# Patient Record
Sex: FEMALE | Race: WHITE
[De-identification: names, ages, dates, MRNs, and addresses within clinical notes are randomized per-mention and may not be internally consistent; named-entity substitution may affect disease eponyms.]

---

## 2019-08-25 ENCOUNTER — HOSPITAL ENCOUNTER (INPATIENT)
Dept: HOSPITAL 35 - ER | Age: 84
LOS: 1 days | Discharge: NURSING, REHAB OR LONG-TERM CARE | DRG: 149 | End: 2019-08-26
Attending: INTERNAL MEDICINE | Admitting: INTERNAL MEDICINE
Payer: COMMERCIAL

## 2019-08-25 VITALS — SYSTOLIC BLOOD PRESSURE: 173 MMHG | DIASTOLIC BLOOD PRESSURE: 78 MMHG

## 2019-08-25 VITALS — WEIGHT: 124.3 LBS | HEIGHT: 65.98 IN | BODY MASS INDEX: 19.98 KG/M2

## 2019-08-25 VITALS — DIASTOLIC BLOOD PRESSURE: 77 MMHG | SYSTOLIC BLOOD PRESSURE: 146 MMHG

## 2019-08-25 VITALS — SYSTOLIC BLOOD PRESSURE: 174 MMHG | DIASTOLIC BLOOD PRESSURE: 79 MMHG

## 2019-08-25 VITALS — SYSTOLIC BLOOD PRESSURE: 136 MMHG | DIASTOLIC BLOOD PRESSURE: 82 MMHG

## 2019-08-25 DIAGNOSIS — I10: ICD-10-CM

## 2019-08-25 DIAGNOSIS — F32.9: ICD-10-CM

## 2019-08-25 DIAGNOSIS — E11.9: ICD-10-CM

## 2019-08-25 DIAGNOSIS — E78.5: ICD-10-CM

## 2019-08-25 DIAGNOSIS — F03.90: ICD-10-CM

## 2019-08-25 DIAGNOSIS — Z79.84: ICD-10-CM

## 2019-08-25 DIAGNOSIS — H81.10: Primary | ICD-10-CM

## 2019-08-25 DIAGNOSIS — F41.9: ICD-10-CM

## 2019-08-25 LAB
ALBUMIN SERPL-MCNC: 3.6 G/DL (ref 3.4–5)
ALT SERPL-CCNC: 20 U/L (ref 30–65)
ANION GAP SERPL CALC-SCNC: 12 MMOL/L (ref 7–16)
AST SERPL-CCNC: 24 U/L (ref 15–37)
BASOPHILS NFR BLD AUTO: 0.7 % (ref 0–2)
BILIRUB SERPL-MCNC: 0.6 MG/DL
BILIRUB UR-MCNC: NEGATIVE MG/DL
BUN SERPL-MCNC: 10 MG/DL (ref 7–18)
CALCIUM SERPL-MCNC: 9.3 MG/DL (ref 8.5–10.1)
CHLORIDE SERPL-SCNC: 100 MMOL/L (ref 98–107)
CO2 SERPL-SCNC: 23 MMOL/L (ref 21–32)
COLOR UR: YELLOW
CREAT SERPL-MCNC: 0.7 MG/DL (ref 0.6–1)
EOSINOPHIL NFR BLD: 0.3 % (ref 0–3)
ERYTHROCYTE [DISTWIDTH] IN BLOOD BY AUTOMATED COUNT: 13.8 % (ref 10.5–14.5)
GLUCOSE SERPL-MCNC: 161 MG/DL (ref 74–106)
GRANULOCYTES NFR BLD MANUAL: 66.1 % (ref 36–66)
HCT VFR BLD CALC: 41.3 % (ref 37–47)
HGB BLD-MCNC: 14.2 GM/DL (ref 12–15)
KETONES UR STRIP-MCNC: (no result) MG/DL
LIPASE: 140 U/L (ref 73–393)
LYMPHOCYTES NFR BLD AUTO: 28.3 % (ref 24–44)
MCH RBC QN AUTO: 30.6 PG (ref 26–34)
MCHC RBC AUTO-ENTMCNC: 34.5 G/DL (ref 28–37)
MCV RBC: 88.6 FL (ref 80–100)
MONOCYTES NFR BLD: 4.6 % (ref 1–8)
NEUTROPHILS # BLD: 6.3 THOU/UL (ref 1.4–8.2)
PLATELET # BLD: 237 THOU/UL (ref 150–400)
POTASSIUM SERPL-SCNC: 3.5 MMOL/L (ref 3.5–5.1)
PROT SERPL-MCNC: 8 G/DL (ref 6.4–8.2)
RBC # BLD AUTO: 4.66 MIL/UL (ref 4.2–5)
RBC # UR STRIP: (no result) /UL
RBC #/AREA URNS HPF: (no result) /HPF (ref 0–2)
SODIUM SERPL-SCNC: 135 MMOL/L (ref 136–145)
SP GR UR STRIP: 1.01 (ref 1–1.03)
SQUAMOUS: (no result) /LPF (ref 0–3)
SSA (PROTEIN CONFIRMATORY): (no result) MG/DL
URINE CLARITY: CLEAR
URINE GLUCOSE-RANDOM*: NEGATIVE
URINE LEUKOCYTES-REFLEX: NEGATIVE
URINE NITRITE-REFLEX: NEGATIVE
URINE WBC-REFLEX: (no result) /HPF (ref 0–5)
UROBILINOGEN UR STRIP-ACNC: 0.2 E.U./DL (ref 0.2–1)
WBC # BLD AUTO: 9.5 THOU/UL (ref 4–11)

## 2019-08-25 PROCEDURE — 10183: CPT

## 2019-08-25 NOTE — NUR
PT ARRIVED ON THE UNIT @2000 FROM ER A&OX3 WITH HX OF DEMENTIA. SON DAVID(DPOA)
HELPED WITH ANSWERING QUESTIONS AND ADMISSION. ZOFRAN GIVEN IN THE ER DUE TO
N/V. DENIES PAIN. UP WITH ASSISTX1 TO THE BATHROOM. SLIGHT DIZZINESS NOTED.
ADM DOCUMENTED, FALL PREC IN PLACE AND ROOM NEAR TO THE NURSE STATION. WILL
CONTINUE TO MONITOR TILL EOS.

## 2019-08-25 NOTE — H
[facility]
[address]
[place]   [address]                     HISTORY AND PHYSICAL          
_______________________________________________________________________________
 
Name:       MICHEAL SAUNDERS             Room #:         429-P       ADM IN  
M.R.#:      [id number]                       Account #:      [id number]  
Admission:  08/25/19    Attend Phys:    Kimberley Rodriguez MD     
Discharge:              Date of Birth:  [birth year]  
                                                          Report #: [id number]
                                                                    [id number]   
_______________________________________________________________________________
THIS REPORT FOR:   //name//                          
 
CC: Kimberley Rodriguez
 
HISTORY OF PRESENT ILLNESS:  The patient is an 84-year-old female who was
brought to the Emergency Room with severe vertigo where she was feeling very
dizzy and unsteady.  The patient was having some extensive nausea and vomiting. 
For this reason, she was given some meclizine and eventually if she was given
benzodiazepine without improvement of her symptoms and eventually she was
admitted to the hospital overnight.  When I came to see her today, the patient's
symptoms have cleared up completely.  The patient was able to move her head
without any problem and she was able to move in the room in the presence of
myself, the nurse and her son without any problem.  The patient is not able to
remember what happened to her yesterday because of her underlying dementia.
 
PAST MEDICAL HISTORY:  Significant for dementia, depression, anxiety and type 2
diabetes mellitus.  The patient does not use any walker or cane in the skilled
nursing facility.  The patient has a history of hyperlipidemia in addition to
hypertension.
 
MEDICATIONS:  The patient's medications include Aricept 5 mg daily, citalopram
10 mg daily, metformin 500 mg b.i.d., acetaminophen 650 mg every 8 hours,
Imodium 2 mg to use as directed, milk of magnesia 15 mL t.i.d. p.r.n., bisacodyl
5 mg daily and memantine 10 mg daily.
 
ALLERGIES:  No known drug allergies.
 
SOCIAL HISTORY:  The patient is living in a skilled memory care assisted living.
 No recent history of smoking, alcohol use or drug use.
 
REVIEW OF SYSTEMS:  Negative besides what was mentioned above.
 
PHYSICAL EXAMINATION:
VITAL SIGNS:  On arrival to the hospital, the patient's temperature was 36.7,
pulse 53, respirations 18, blood pressure 191/87 and last blood pressure is
173/78.
HEAD AND NECK:  Unremarkable.   Neck was supple.
LUNGS:  Clear to auscultation.
CARDIAC:  S1, S2.
ABDOMEN:  Benign.  Bowel sounds were positive.
EXTREMITIES:  Without any edema.
NEUROLOGIC:  Cranial nerves were intact.  Power is 5/5 on the upper and lower
extremities.  The patient does not have any sensory deficit.  The patient was
able to move her head in all directions without any limitation.  The patient
does not have any nystagmus.  The patient was able to walk without any
assistance and without any signs of vertigo or any nausea or vomiting.
 
 
 
[place] [address]                     HISTORY AND PHYSICAL          
_______________________________________________________________________________
 
Name:       MICHEAL SAUNDERS             Room #:         429-P       ADM IN  
M.R.#:      [id number]                       Account #:      [id number]  
Admission:  08/25/19    Attend Phys:    Kimberley Rodriguez MD     
Discharge:              Date of Birth:  [birth year]  
                                                          Report #: [id number]
                                                                    [id number]   
_______________________________________________________________________________
 
ASSESSMENT AND PLAN:  Benign positional vertigo that has cleared up completely. 
I talked to the patient's son and to the nurse on the floor about the patient's
condition.  We are planning to have the patient eat her diet and ambulate as
much as possible.  I will ask for the patient to be discharged back to her
assisted living today because his symptoms have cleared up completely.  The
patient will be followed at the assisted living.
 
 
 
 
 
 
 
 
 
 
 
 
 
 
 
 
 
 
 
 
 
 
 
 
 
 
 
 
 
 
 
 
 
 
 
 
 
                         
   By:                               
                   
D: 08/26/19 0855                           _____________________________________
T: 08/26/19 0908                           Kimberley Rodriguez MD               /nt

## 2019-08-26 VITALS — SYSTOLIC BLOOD PRESSURE: 191 MMHG | DIASTOLIC BLOOD PRESSURE: 86 MMHG

## 2019-08-26 NOTE — NUR
PT ASSESSED AT START OF SHIFT. PT UP AND AROUND IN THE ROOM W/ STANDBY AND
DOING WELL. STATES NO MORE DIZZINESS. DR. ZEPEDA IN WHILE SON HERE AND
DISCUSSED PLAN OF CARE TO SEND BACK HOME TODAY AND HAVE HOME HEALTH F/U.
INSTRUCTED PT TO DRINK WATER DURING THE DAY.
PT DISCHARGED AT THIS TIME W/ SON TRANSPORTING.

## 2019-08-26 NOTE — EKG
[facility] [address]
[place]  [address]
Phone:  [phone]                    ELECTROCARDIOGRAM REPORT      
_______________________________________________________________________________
 
Name:       MICHEAL SAUNDERS             Room #:         429-P       ADM IN  
M.R.#:      [id number]     Account #:      [id number]  
Admission:  19    Attend Phys:    Kimberley Rodriguez MD     
Discharge:              Date of Birth:  [birth year]  
                                                          Report #: [id number]
   [id number]
_______________________________________________________________________________
THIS REPORT FOR:   //name//                          
 
                         [facility] ED
                                       
Test Date:    2019               Test Time:    18:40:39
Pat Name:     MICHEAL SAUNDERS        Department:   
Patient ID:   [id number]            Room:         429
Gender:       F                        Technician:   HOLDEN
:          1935               Requested By: Juanid Knight
Order Number: [id number] MD:   Leobardo Machado
                                 Measurements
Intervals                              Axis          
Rate:         56                       P:            42
MD:           223                      QRS:          -7
QRSD:         101                      T:            15
QT:           488                                    
QTc:          472                                    
                           Interpretive Statements
Sinus bradycardia
Prolonged MD interval
Poor R wave progression
No previous ECG available for comparison
 
Electronically Signed On 2019 7:57:29 CDT by Leobardo Machado
[email or web address]
 
 
 
 
 
 
 
 
 
 
 
 
 
 
 
 
 
 
  <ELECTRONICALLY SIGNED>
   By: Leobardo Machado MD, [facility]   
  19     0757
D: 19 1840                           _____________________________________
T: 19 1840                           Leobardo Machado MD, FACC     /EPI

## 2019-08-26 NOTE — NUR
INITIAL ASSESSMENT:
Pt evaluated for d/c planning needs.  Reviewed chart and spoke with nurse, pt
and pt's son.  Pt is a long term resident at [facility].
Pt was independent with ADL's and used no DME.  Pt has history of dementia.
Son said that he was trying to make arrangements with [facility] for outpatient
PT and OT.  Spoke with admissions coordinator at [facility] and faxed referral.
 She said she would contact K[facility] to continue services with
outpatient PT and OT at facility.  Pt to be d/c today.  Son to provide
transport.  Patient choice letter signed and placed on chart.  No other needs
identified.

## 2019-11-19 ENCOUNTER — HOSPITAL ENCOUNTER (OUTPATIENT)
Dept: HOSPITAL 35 - CAT | Age: 84
End: 2019-11-19
Attending: INTERNAL MEDICINE
Payer: COMMERCIAL

## 2019-11-19 DIAGNOSIS — R90.82: ICD-10-CM

## 2019-11-19 DIAGNOSIS — R41.82: Primary | ICD-10-CM

## 2019-11-19 LAB
ANION GAP SERPL CALC-SCNC: 11 MMOL/L (ref 7–16)
BASOPHILS NFR BLD AUTO: 0.8 % (ref 0–2)
BUN SERPL-MCNC: 15 MG/DL (ref 7–18)
CALCIUM SERPL-MCNC: 10.1 MG/DL (ref 8.5–10.1)
CHLORIDE SERPL-SCNC: 102 MMOL/L (ref 98–107)
CO2 SERPL-SCNC: 26 MMOL/L (ref 21–32)
CREAT SERPL-MCNC: 0.8 MG/DL (ref 0.6–1)
EOSINOPHIL NFR BLD: 1.7 % (ref 0–3)
ERYTHROCYTE [DISTWIDTH] IN BLOOD BY AUTOMATED COUNT: 13.4 % (ref 10.5–14.5)
GLUCOSE SERPL-MCNC: 127 MG/DL (ref 74–106)
GRANULOCYTES NFR BLD MANUAL: 50.3 % (ref 36–66)
HCT VFR BLD CALC: 40.8 % (ref 37–47)
HGB BLD-MCNC: 13.6 GM/DL (ref 12–15)
LYMPHOCYTES NFR BLD AUTO: 39.2 % (ref 24–44)
MCH RBC QN AUTO: 29.5 PG (ref 26–34)
MCHC RBC AUTO-ENTMCNC: 33.4 G/DL (ref 28–37)
MCV RBC: 88.3 FL (ref 80–100)
MONOCYTES NFR BLD: 8 % (ref 1–8)
NEUTROPHILS # BLD: 4.6 THOU/UL (ref 1.4–8.2)
PLATELET # BLD: 417 THOU/UL (ref 150–400)
POTASSIUM SERPL-SCNC: 4.2 MMOL/L (ref 3.5–5.1)
RBC # BLD AUTO: 4.62 MIL/UL (ref 4.2–5)
SODIUM SERPL-SCNC: 139 MMOL/L (ref 136–145)
WBC # BLD AUTO: 9.1 THOU/UL (ref 4–11)

## 2020-07-08 ENCOUNTER — HOSPITAL ENCOUNTER (INPATIENT)
Dept: HOSPITAL 35 - ER | Age: 85
LOS: 5 days | Discharge: NURSING, REHAB OR LONG-TERM CARE | DRG: 470 | End: 2020-07-13
Attending: INTERNAL MEDICINE | Admitting: INTERNAL MEDICINE
Payer: COMMERCIAL

## 2020-07-08 VITALS — DIASTOLIC BLOOD PRESSURE: 101 MMHG | SYSTOLIC BLOOD PRESSURE: 148 MMHG

## 2020-07-08 VITALS — BODY MASS INDEX: 22.73 KG/M2 | HEIGHT: 62.99 IN | WEIGHT: 128.3 LBS

## 2020-07-08 VITALS — DIASTOLIC BLOOD PRESSURE: 67 MMHG | SYSTOLIC BLOOD PRESSURE: 125 MMHG

## 2020-07-08 VITALS — SYSTOLIC BLOOD PRESSURE: 132 MMHG | DIASTOLIC BLOOD PRESSURE: 84 MMHG

## 2020-07-08 VITALS — SYSTOLIC BLOOD PRESSURE: 194 MMHG | DIASTOLIC BLOOD PRESSURE: 90 MMHG

## 2020-07-08 DIAGNOSIS — F02.80: ICD-10-CM

## 2020-07-08 DIAGNOSIS — E78.5: ICD-10-CM
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
## 2020-07-08 DIAGNOSIS — S72.011A: Primary | ICD-10-CM

## 2020-07-08 DIAGNOSIS — F41.9: ICD-10-CM

## 2020-07-08 DIAGNOSIS — G31.83: ICD-10-CM

## 2020-07-08 DIAGNOSIS — Z79.82: ICD-10-CM

## 2020-07-08 DIAGNOSIS — Z79.84: ICD-10-CM
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
## 2020-07-08 DIAGNOSIS — I10: ICD-10-CM

## 2020-07-08 DIAGNOSIS — N39.0: ICD-10-CM

## 2020-07-08 DIAGNOSIS — F32.9: ICD-10-CM

## 2020-07-08 DIAGNOSIS — Z20.828: ICD-10-CM
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
## 2020-07-08 DIAGNOSIS — Z79.899: ICD-10-CM

## 2020-07-08 DIAGNOSIS — E11.9: ICD-10-CM
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
## 2020-07-08 LAB
ANION GAP SERPL CALC-SCNC: 8 MMOL/L (ref 7–16)
BACTERIA-REFLEX: (no result) /HPF
BASOPHILS NFR BLD AUTO: 0.9 % (ref 0–2)
BILIRUB UR-MCNC: NEGATIVE MG/DL
BUN SERPL-MCNC: 10 MG/DL (ref 7–18)
CALCIUM SERPL-MCNC: 9.2 MG/DL (ref 8.5–10.1)
CHLORIDE SERPL-SCNC: 102 MMOL/L (ref 98–107)
CO2 SERPL-SCNC: 28 MMOL/L (ref 21–32)
COLOR UR: YELLOW
CREAT SERPL-MCNC: 0.9 MG/DL (ref 0.6–1)
EOSINOPHIL NFR BLD: 1.3 % (ref 0–3)
ERYTHROCYTE [DISTWIDTH] IN BLOOD BY AUTOMATED COUNT: 14.1 % (ref 10.5–14.5)
GLUCOSE SERPL-MCNC: 172 MG/DL (ref 74–106)
GRANULOCYTES NFR BLD MANUAL: 65.5 % (ref 36–66)
HCT VFR BLD CALC: 41.2 % (ref 37–47)
HGB BLD-MCNC: 13.9 GM/DL (ref 12–15)
INR PPP: 1
KETONES UR STRIP-MCNC: NEGATIVE MG/DL
LYMPHOCYTES NFR BLD AUTO: 25.8 % (ref 24–44)
MCH RBC QN AUTO: 30.6 PG (ref 26–34)
MCHC RBC AUTO-ENTMCNC: 33.8 G/DL (ref 28–37)
MCV RBC: 90.6 FL (ref 80–100)
MONOCYTES NFR BLD: 6.5 % (ref 1–8)
NEUTROPHILS # BLD: 6.2 THOU/UL (ref 1.4–8.2)
PLATELET # BLD: 275 THOU/UL (ref 150–400)
POTASSIUM SERPL-SCNC: 3.9 MMOL/L (ref 3.5–5.1)
PROTHROMBIN TIME: 10.6 SECONDS (ref 9.3–11.4)
RBC # BLD AUTO: 4.55 MIL/UL (ref 4.2–5)
RBC # UR STRIP: NEGATIVE /UL
SODIUM SERPL-SCNC: 138 MMOL/L (ref 136–145)
SP GR UR STRIP: 1.01 (ref 1–1.03)
SQUAMOUS: (no result) /LPF (ref 0–3)
URINE CLARITY: CLEAR
URINE GLUCOSE-RANDOM*: NEGATIVE
URINE LEUKOCYTES-REFLEX: (no result)
URINE NITRITE-REFLEX: NEGATIVE
URINE PROTEIN (DIPSTICK): NEGATIVE
URINE WBC-REFLEX: (no result) /HPF (ref 0–5)
UROBILINOGEN UR STRIP-ACNC: 0.2 E.U./DL (ref 0.2–1)
WBC # BLD AUTO: 9.5 THOU/UL (ref 4–11)

## 2020-07-08 PROCEDURE — 50414: CPT

## 2020-07-08 PROCEDURE — 51225: CPT

## 2020-07-08 PROCEDURE — 10102: CPT

## 2020-07-08 PROCEDURE — 56530: CPT

## 2020-07-08 PROCEDURE — 62900: CPT

## 2020-07-08 PROCEDURE — 70005: CPT

## 2020-07-08 PROCEDURE — 51057: CPT

## 2020-07-08 PROCEDURE — 50445: CPT

## 2020-07-08 PROCEDURE — 56525: CPT

## 2020-07-08 PROCEDURE — 56521: CPT

## 2020-07-08 PROCEDURE — 62110: CPT

## 2020-07-08 PROCEDURE — 50010 RENAL EXPLORATION: CPT

## 2020-07-08 PROCEDURE — 51130: CPT

## 2020-07-08 PROCEDURE — 53369: CPT

## 2020-07-08 PROCEDURE — 10100: CPT

## 2020-07-08 PROCEDURE — 50101: CPT

## 2020-07-08 PROCEDURE — 57103: CPT

## 2020-07-08 PROCEDURE — 53000 INCISION OF URETHRA: CPT

## 2020-07-08 PROCEDURE — 51412: CPT

## 2020-07-08 PROCEDURE — 50382 CHANGE URETER STENT PERCUT: CPT

## 2020-07-08 NOTE — EKG
[facility]
[place]   [address]                     ELECTROCARDIOGRAM REPORT      
_______________________________________________________________________________
 
Name:       MICHEAL SAUNDERS             Room #:                     [facility]#:      [id number]                       Account #:      [id number]  
Admission:  20    Attend Phys:                          
Discharge:              Date of Birth:  [birth year]  
                                                          Report #: [id number]
                                                                    [id number]
_______________________________________________________________________________
THIS REPORT FOR:  
 
cc:  Kimberley Rodriguez MD, Ammar MD Couchonnal, Luis F. MD                                            ~
THIS REPORT FOR:   //name//                          
 
                         [facility] ED
                                       
Test Date:    2020               Test Time:    12:58:41
Pat Name:     MICHEAL SAUNDERS        Department:   
Patient ID:   [id number]            Room:          
Gender:       F                        Technician:   
:          1935               Requested By: Alexandra Callahan
Order Number: [id number] MD:   Laci Jackson
                                 Measurements
Intervals                              Axis          
Rate:         72                       P:            45
NH:           194                      QRS:          -37
QRSD:         110                      T:            11
QT:           420                                    
QTc:          460                                    
                           Interpretive Statements
Sinus rhythm
Left ventricular hypertrophy
Baseline wander in lead(s) V5
Compared to ECG 2019 18:40:39
Electronically Signed On 2020 14:32:04 CDT by Laci Jackson
[email or web address]
 
 
 
 
 
 
 
 
 
 
 
 
 
 
 
  <ELECTRONICALLY SIGNED>
   By: Laci Jackson MD        
  20     1432
D: 20 1258                           _____________________________________
T: 20 1258                           Laci Jackson MD          /[facility] [place] [facility]

## 2020-07-08 NOTE — EMS
[facility]
[address] [place]   [address]                     EMS Patient Care Report       
_______________________________________________________________________________
 
Name:       MICHEAL SAUNDERS             Room #:         436-P       ADM IN  
M.R.#:      [id number]                       Account #:      [id number]  
Admission:  20    Attend Phys:    Kimberley Rodriguez MD     
Discharge:              Date of Birth:  [birth year]  
                                                          Report #: [id number]
                                                                    [id number]
_______________________________________________________________________________
THIS REPORT FOR:   //name//                          
 
Report Transmitted: 2020 03:57
EMS Care Summary
[place], [place]
Incident [id number] @ 2020 11:04
 
Incident Location
[address]
 
Patient
MICHEAL SAUNDERS
Female, 85 Years
 1935
 
Patient Address
[address]
[place] [address]
 
Patient History
Dementia,Diabetes,Hypertension (HTN),Hyperlipidemia,Depression,Anxiety,
 
Patient Allergies
No known allergies,
 
Patient Medications
Metformin, Loperamide, Zofran, Aspirin, Ativan, Seroquel, Tylenol,
 
Chief Complaint
PAIN IN RIGHT SHOULDER AND RIGHT LEG
 
Disposition
Transported No Lights/[place]
 
Dispatch Reason
Falls
 
Transported To
[facility]
 
Narrative
SCENE; ON ARRIVAL PT FOUND LYING UPRIGHT IN BED IN BEDROOM AT ADDRESS PROVIDED. 
PT IS AWAKE AND ALERT WITH A GCS OF 15. STAFF REPORTS PT SUSTAINED AN 
UNWITNESSED FALL, AND WAS FOUND BY FACILITY STAFF. PT REPORTS SHE LOST HER 
 
 
 
[facility]
[address] [place]   [address]                     EMS Patient Care Report       
_______________________________________________________________________________
 
Name:       MICHEAL SAUNDERS             Room #:         436-P       ADM IN  
..#:      [id number]                       Account #:      [id number]  
Admission:  20    Attend Phys:    Kimberley Rodriguez MD     
Discharge:              Date of Birth:  [birth year]  
                                                          Report #: [id number]
                                                                    [id number]
_______________________________________________________________________________
FOOTING, AND FELL FROM A STANDING POSITION ONTO THE CARPET. PT DENIES LOC. PT 
HAS NO OBVIOUS INJURIES, BUT C/O RIGHT ELBOW AND RIGHT LEG PAIN. PT HAS A HX OF 
DEMENTIA AND STAFF REPORTS PT IS TYPICALLY SOMEWHAT CONFUSED, DEPENDING ON THE 
DAY, BUT IS ALSO TYPICALLY FAIRLY STUBBORN. PT INITIALLY DOES NOT WISH TO GO 
WITH EMS, BUT AGREES TO GO WITH EMS FOR URTHER EVAL AND TREATMENT BY AN ER DR. 
PT ASSISTED ONTO EMS STRETCHER. 
 
AMBULANCE VITALS MONITORED THROUGHOUT TRANSPORT. NO CHANGES IN PT STATUS EN 
ROUTE. 
 
Initial Vitals
@11:29P: 79,R: 20,BP: 178/72,Pain: 2/10,GCS: 14,Revised Trauma: 12,
@11:14P: 80,R: 20,Pain: 2/10,GCS: 14,Revised Trauma: 12,
 
Assessments
@11:15MENTAL:No Abnormalities,SKIN:No Abnormalities,HEENT:Head/Face: No 
Abnormalities,Eyes: No Abnormalities,Neck/Airway: No Abnormalities,LUNG 
SOUNDS:General: No Abnormalities,Left Upper: No Abnormalities,Right Upper: No 
Abnormalities,Left Lower: No Abnormalities,Right Lower: No 
Abnormalities,ABDOMEN:General: No Abnormalities,Left Upper: No 
Abnormalities,Right Upper: No Abnormalities,Left Lower: No Abnormalities,Right 
Lower: No Abnormalities,PELVIS//GI:No Abnormalities,EXTREMITIES:Right Arm: 
Other,Right Leg: Other,Left Arm: No Abnormalities,Left Leg: No 
Abnormalities,PULSE:NEURO:No Abnormalities,@11:26MENTAL:No 
Abnormalities,SKIN:No Abnormalities,HEENT:Head/Face: No Abnormalities,Eyes: No 
Abnormalities,Neck/Airway: No Abnormalities,LUNG SOUNDS:General: No 
Abnormalities,Left Upper: No Abnormalities,Right Upper: No Abnormalities,Left 
Lower: No Abnormalities,Right Lower: No Abnormalities,ABDOMEN:General: No 
Abnormalities,Left Upper: No Abnormalities,Right Upper: No Abnormalities,Left 
Lower: No Abnormalities,Right Lower: No Abnormalities,PELVIS//GI:No 
Abnormalities,EXTREMITIES:PULSE:NEURO:No Abnormalities, 
 
Impression
Extremity Pain
 
Procedures
@11:15ALS AssessmentResponse: UnchangedSucceeded@11:24StretcherResponse: 
Unchanged 
 
Timeline
11:02,Call Received
11:02,Dispatch Notified
11:04,Dispatched
11:04,En Route
11:12,On Scene
11:14,At Patient
 
 
 
[address]   [address]                     EMS Patient Care Report       
_______________________________________________________________________________
 
Name:       MICHEAL SAUNDERS             Room #:         [facility]-       ADM IN  
M.R.#:      [id number]                       Account #:      [id number]  
Admission:  20    Attend Phys:    Kimberley Rodriguez MD     
Discharge:              Date of Birth:  [birth year]  
                                                          Report #: [id number]
                                                                    [id number]
_______________________________________________________________________________
11:14,BP: 162/ M,PULSE: 80,RR: 20 R,SPO2:  Ox,ETCO2:  ,BG: ,PAIN: 2,GCS: 14,
11:15,ALS Assessment,Response: UnchangedSucceeded,
11:24,Stretcher,Response: Unchanged
11:29,BP: 178/72 M,PULSE: 79,RR: 20 R,SPO2:  Ox,ETCO2:  ,BG: ,PAIN: 2,GCS: 14,
11:29,Depart Scene
11:33,At Destination
11:53,Call Closed
 
Disclaimer
v1.1     Copyright 2020 [employer]
This EMS Care Summary contains data elements from the applicable legal record 
(which may be displayed differently). It is designed to provide pertinent 
information for the following purposes: continuity of care, clinical quality, 
and state data reporting. The complete legal record is available to ED staff 
and administrators of the receiving hospital in [employer]'s Patient Tracker. All data 
is provided "as is."

## 2020-07-09 VITALS — DIASTOLIC BLOOD PRESSURE: 76 MMHG | SYSTOLIC BLOOD PRESSURE: 138 MMHG

## 2020-07-09 VITALS — DIASTOLIC BLOOD PRESSURE: 84 MMHG | SYSTOLIC BLOOD PRESSURE: 175 MMHG

## 2020-07-09 VITALS — DIASTOLIC BLOOD PRESSURE: 81 MMHG | SYSTOLIC BLOOD PRESSURE: 152 MMHG

## 2020-07-09 VITALS — SYSTOLIC BLOOD PRESSURE: 155 MMHG | DIASTOLIC BLOOD PRESSURE: 65 MMHG

## 2020-07-09 VITALS — DIASTOLIC BLOOD PRESSURE: 65 MMHG | SYSTOLIC BLOOD PRESSURE: 141 MMHG

## 2020-07-09 VITALS — SYSTOLIC BLOOD PRESSURE: 149 MMHG | DIASTOLIC BLOOD PRESSURE: 70 MMHG

## 2020-07-09 VITALS — SYSTOLIC BLOOD PRESSURE: 154 MMHG | DIASTOLIC BLOOD PRESSURE: 74 MMHG

## 2020-07-09 LAB
ANION GAP SERPL CALC-SCNC: 9 MMOL/L (ref 7–16)
BUN SERPL-MCNC: 9 MG/DL (ref 7–18)
CALCIUM SERPL-MCNC: 8.5 MG/DL (ref 8.5–10.1)
CHLORIDE SERPL-SCNC: 104 MMOL/L (ref 98–107)
CO2 SERPL-SCNC: 24 MMOL/L (ref 21–32)
CREAT SERPL-MCNC: 0.7 MG/DL (ref 0.6–1)
ERYTHROCYTE [DISTWIDTH] IN BLOOD BY AUTOMATED COUNT: 14.5 % (ref 10.5–14.5)
GLUCOSE SERPL-MCNC: 192 MG/DL (ref 74–106)
HCT VFR BLD CALC: 39.9 % (ref 37–47)
HGB BLD-MCNC: 13 GM/DL (ref 12–15)
MCH RBC QN AUTO: 30.2 PG (ref 26–34)
MCHC RBC AUTO-ENTMCNC: 32.7 G/DL (ref 28–37)
MCV RBC: 92.5 FL (ref 80–100)
PLATELET # BLD: 242 THOU/UL (ref 150–400)
POTASSIUM SERPL-SCNC: 3.8 MMOL/L (ref 3.5–5.1)
RBC # BLD AUTO: 4.31 MIL/UL (ref 4.2–5)
SODIUM SERPL-SCNC: 137 MMOL/L (ref 136–145)
WBC # BLD AUTO: 8.5 THOU/UL (ref 4–11)

## 2020-07-09 PROCEDURE — 0SRR0J9 REPLACEMENT OF RIGHT HIP JOINT, FEMORAL SURFACE WITH SYNTHETIC SUBSTITUTE, CEMENTED, OPEN APPROACH: ICD-10-PCS | Performed by: ORTHOPAEDIC SURGERY

## 2020-07-09 NOTE — EKG
[facility]
[address]
[place]   [address]                     ELECTROCARDIOGRAM REPORT      
_______________________________________________________________________________
 
Name:       MICHEAL SAUNDERS             Room #:         436-P       ADM IN  
M.R.#:      [id number]                       Account #:      [id number]  
Admission:  20    Attend Phys:    Kimberley Rodriguez MD     
Discharge:              Date of Birth:  [birth year]  
                                                          Report #: [id number]
                                                                    [id number]
_______________________________________________________________________________
THIS REPORT FOR:  
 
cc:  Kimberley Rodriguez MD, Ammar MD Lundgren,Leobardo WU MD [facility]                                        ~
THIS REPORT FOR:   //name//                          
 
                         [facility] ED
                                       
Test Date:    2020               Test Time:    16:01:47
Pat Name:     MICHEAL SAUNDERS        Department:   
Patient ID:   [id number]            Room:         436 
Gender:       F                        Technician:   TASHA
:          1935               Requested By: Alexandra Callahan
Order Number: [id number] MD:   Leobardo Machado
                                 Measurements
Intervals                              Axis          
Rate:         115                      P:            -33
IN:           150                      QRS:          -47
QRSD:         91                       T:            28
QT:           322                                    
QTc:          446                                    
                           Interpretive Statements
Sinus tachycardia
Left anterior fascicular block
Poor R wave progression
Nonspecific ST segment abnormality
Compared to ECG 2020 12:58:41
ST segment abnormality is now present
Electronically Signed On 2020 7:42:55 CDT by Leobardo Machado
[email or web address]
 
 
 
 
 
 
 
 
 
 
 
 
 
  <ELECTRONICALLY SIGNED>
   By: Leobardo Machado MD, [facility]   
  20     0742
D: 20 1601                           _____________________________________
T: 20 1601                           Leobardo Machado MD, FAC     /EPI

## 2020-07-09 NOTE — HC
[facility]
[address]
[place]   [address]                     CONSULTATION                  
_______________________________________________________________________________
 
Name:       MICHEAL SAUNDERS             Room #:         [facility]-       ADM IN  
M.R.#:      [id number]                       Account #:      [id number]  
Admission:  07/08/20    Attend Phys:    Kimberley Rodriguez MD     
Discharge:              Date of Birth:  [birth year]  
                                                          Report #: [id number]
                                                                    [id number]   
_______________________________________________________________________________
THIS REPORT FOR:  
 
cc:  Kimberley Rodriguez MD,Onesimo العلي MD, MD                                           ~
CC: Kimberley Rodriguez
 
DATE OF SERVICE:  07/08/2020
 
 
CHIEF COMPLAINT:  Right proximal femoral fracture.
 
HISTORY OF PRESENT ILLNESS:  This 85-year-old female is demented and I believe
lives in an extended care facility.  She is confused and ambulates
independently, but has poor balance and I believe has had multiple falls.  Her
son states that he has tried to encourage therapy and use a walker, but she has
refused.  She fell again today injuring the right hip.  She was brought to [facility] Emergency Department where x-rays confirm an impacted, slightly angulated
fracture of the right femoral neck.  She has no other apparent injuries.
 
At the time of my evaluation, she is confused and therefore difficult to achieve
any sort of a history.  She seems to have no obvious complaints or injuries
involving the neck, back or upper extremities.  The right lower extremity does
not appear to be shortened or significantly rotated or deformed.  There is,
however, discomfort with any attempted movement about the right hip.  The right
knee, lower leg, foot, and ankle appear to be normal.  The left lower extremity
reveals satisfactory movement at the hip with minimal discomfort.  The left
knee, lower leg, foot, and ankle appear to be normal.
 
X-rays of the pelvis and right hip reveal an impacted femoral neck fracture,
which is displaced posteriorly and into valgus position.  While there is
satisfactory bony contact at this point, the fracture does appear to be
moderately unstable given the posterior rotational deformity.
 
IMPRESSION:  Right femoral neck fracture with moderate displacement.  I have
discussed this issue at some length with the patient's eldest son who is her
durable power of .  I have explained that we could manage this either
without surgery or with percutaneous screw fixation or with proximal femoral
hemiarthroplasty.  He states that she is still ambulating and is not very
cooperative.  Given this, I doubt that nonsurgical management or percutaneous
screw fixation would be successful.  Consequently, I believe a proximal femoral
hemiarthroplasty would be the best option to allow her to resume ambulation with
hopefully some assistance and some monitoring.  At the time of this dictation,
the son is in general agreement and would like to proceed with planning and
scheduling for surgery either tomorrow or the following day.  He notes he will
be talking with the rest of the family before offering his final permission. 
 
 
 
[place] [address]                     CONSULTATION                  
_______________________________________________________________________________
 
Name:       MICHEAL SAUNDERS             Room #:         436-P       [facility] [place]  
M.R.#:      [id number]                       Account #:      [id number]  
Admission:  07/08/20    Attend Phys:    Kimberley Rodriguez MD     
Discharge:              Date of Birth:  [birth year]  
                                                          Report #: [id number]
                                                                    [id number]   
_______________________________________________________________________________
Therefore, at this point, we will go ahead with medical clearance and keep her
n.p.o. after midnight.  If there is time in the operating room tomorrow, then I
will plan to proceed with a proximal femoral hemiarthroplasty.
 
 
 
 
 
 
 
 
 
 
 
 
 
 
 
 
 
 
 
 
 
 
 
 
 
 
 
 
 
 
 
 
 
 
 
 
 
 
 
 
 
  <ELECTRONICALLY SIGNED>
   By: Onesimo Rivera MD           
  07/09/20     1137
D: 07/08/20 1745                           _____________________________________
T: 07/08/20 1830                           Onesimo Rivera MD             /nt

## 2020-07-09 NOTE — NUR
PT ASSESSED AT START OF SHIFT. PT CONFUSED AND CRYING W/ PAIN THIS AFTERNOON.
MORPHINE GIVEN AND PT RELAXED BUT PULLED OUT HER DEGROOT W/ BALLOON INTACT.
INFORMED SURGERY RN TO ENABLE THEN TO REPLACE IT.
TALKED W/ SON AND HE HAD TALKED W/ DR. JACKSON RE SURGERY AND HE TALKED W/ HIS
MOTHER AT THE BEDSIDE.
PT TAKEN TO PREOP AT 1700.

## 2020-07-09 NOTE — NUR
Case opened to follow for dc planning. Writer visited with the pt anirudh at
bedside. She is oriented to person but not place or situation. She indicates
that her son Herber is her emergency contact. She was then taken to surgery for
hip fx. Chart reviewed and discussed with the care team. Writer spoke with her
son Herber who is her medical DPOA. A copy of this document, her living will and
outside the hospital dnr form are on the chart. The pt is a resident from
[facility]. She has been living at [facility] in their
debo since 2018 but moved to the memory care unit this past Nov due to
increased stm loss. She is ambulatory and fell at the facility. Her son
indicates that they would like her to return to SNF at [facility] if she is
able to participate with therapy. He is available via phone as needed. Message
left for [facility] admissions regarding snf referral as they will need to get
auth from Arianna Felder. Will await therapy evals and fax a clinical update
tomorrow. Poss dc 2-3 days pending her postop progress.

## 2020-07-09 NOTE — NUR
PT ARRIVED FROM THE ER VIA CART @1920. AOX2-3 FORGETFULL STILL ABLE TO ANSWER
QUESTIONS. ORIENTED TO THE ROOM. PT SPOKE TO SON OVER THE PHONE. CALLED DR ZEPEDA AND ORDERS RECEIVED FOR PAIN CONTROL AND FLUID INFUISION. MORPHINE
GIVEN FOR RT HIP PAIN OF 5/10. PT NPO FOR SX TOMORROW. FOLLEY CATH IN PLACE.
VSS. CALL LIGHT IN REACH, FALL PREC IN PLACE AND WILL CONT WITH POC TILL EOS.

## 2020-07-10 VITALS — DIASTOLIC BLOOD PRESSURE: 63 MMHG | SYSTOLIC BLOOD PRESSURE: 137 MMHG

## 2020-07-10 VITALS — SYSTOLIC BLOOD PRESSURE: 152 MMHG | DIASTOLIC BLOOD PRESSURE: 70 MMHG

## 2020-07-10 VITALS — SYSTOLIC BLOOD PRESSURE: 155 MMHG | DIASTOLIC BLOOD PRESSURE: 78 MMHG

## 2020-07-10 VITALS — DIASTOLIC BLOOD PRESSURE: 63 MMHG | SYSTOLIC BLOOD PRESSURE: 114 MMHG

## 2020-07-10 LAB
ANION GAP SERPL CALC-SCNC: 11 MMOL/L (ref 7–16)
BASOPHILS NFR BLD AUTO: 0.7 % (ref 0–2)
BUN SERPL-MCNC: 9 MG/DL (ref 7–18)
CALCIUM SERPL-MCNC: 8.2 MG/DL (ref 8.5–10.1)
CHLORIDE SERPL-SCNC: 105 MMOL/L (ref 98–107)
CO2 SERPL-SCNC: 23 MMOL/L (ref 21–32)
CREAT SERPL-MCNC: 0.8 MG/DL (ref 0.6–1)
EOSINOPHIL NFR BLD: 1.1 % (ref 0–3)
ERYTHROCYTE [DISTWIDTH] IN BLOOD BY AUTOMATED COUNT: 15 % (ref 10.5–14.5)
GLUCOSE SERPL-MCNC: 175 MG/DL (ref 74–106)
GRANULOCYTES NFR BLD MANUAL: 64.9 % (ref 36–66)
HCT VFR BLD CALC: 37.7 % (ref 37–47)
HGB BLD-MCNC: 12.9 GM/DL (ref 12–15)
LYMPHOCYTES NFR BLD AUTO: 22.7 % (ref 24–44)
MCH RBC QN AUTO: 31.9 PG (ref 26–34)
MCHC RBC AUTO-ENTMCNC: 34.2 G/DL (ref 28–37)
MCV RBC: 93.4 FL (ref 80–100)
MONOCYTES NFR BLD: 10.6 % (ref 1–8)
NEUTROPHILS # BLD: 7.1 THOU/UL (ref 1.4–8.2)
PLATELET # BLD: 222 THOU/UL (ref 150–400)
POTASSIUM SERPL-SCNC: 4.2 MMOL/L (ref 3.5–5.1)
RBC # BLD AUTO: 4.03 MIL/UL (ref 4.2–5)
SODIUM SERPL-SCNC: 139 MMOL/L (ref 136–145)
WBC # BLD AUTO: 10.9 THOU/UL (ref 4–11)

## 2020-07-10 NOTE — NUR
PT ARRIVED FROM SX @1930 RESTING IN BED. QUOC DRESSING, HEMOVAC INTACT.
TEDHOSE ON BLE. IV INTACT AND FLUIDS STARTED. BLOODSUGAR CHECKED AND INSULIN
ADMINISTERED. SON CALLED THE UNIT AND THIS NURSE UPDATED HIM ON PROGRESS.
MORPHINE GIVEN TO PT FOR PAIN MANAGEMENT AND ICE PACK PLACED FOR COMFORT.
FOLLEY CATH INTACT AND DRAINING. SCD'S IN PLACE. VITAL SIGNS CHECKED. PT ON
CLEAR LIQUID. FALL PREC IN PLACE AND CALL LIGHT IN REACH WILL CONT WITH POC
TILL EOS.

## 2020-07-10 NOTE — NUR
Another message was left for admissions at [facility]. Clinical
updated faxed and with request for snf auth to be submitted for admission over
the weekend if medically stable. Facility number is [id number] and
admissions cell is [phone] and fax number [phone].

## 2020-07-11 VITALS — SYSTOLIC BLOOD PRESSURE: 178 MMHG | DIASTOLIC BLOOD PRESSURE: 86 MMHG

## 2020-07-11 VITALS — DIASTOLIC BLOOD PRESSURE: 64 MMHG | SYSTOLIC BLOOD PRESSURE: 139 MMHG

## 2020-07-11 VITALS — DIASTOLIC BLOOD PRESSURE: 78 MMHG | SYSTOLIC BLOOD PRESSURE: 167 MMHG

## 2020-07-11 LAB
ANION GAP SERPL CALC-SCNC: 9 MMOL/L (ref 7–16)
BUN SERPL-MCNC: 8 MG/DL (ref 7–18)
CALCIUM SERPL-MCNC: 8 MG/DL (ref 8.5–10.1)
CHLORIDE SERPL-SCNC: 103 MMOL/L (ref 98–107)
CO2 SERPL-SCNC: 23 MMOL/L (ref 21–32)
CREAT SERPL-MCNC: 0.6 MG/DL (ref 0.6–1)
ERYTHROCYTE [DISTWIDTH] IN BLOOD BY AUTOMATED COUNT: 14.2 % (ref 10.5–14.5)
GLUCOSE SERPL-MCNC: 158 MG/DL (ref 74–106)
HCT VFR BLD CALC: 35.7 % (ref 37–47)
HGB BLD-MCNC: 11.8 GM/DL (ref 12–15)
MCH RBC QN AUTO: 30.8 PG (ref 26–34)
MCHC RBC AUTO-ENTMCNC: 32.9 G/DL (ref 28–37)
MCV RBC: 93.5 FL (ref 80–100)
PLATELET # BLD: 174 THOU/UL (ref 150–400)
POTASSIUM SERPL-SCNC: 3.6 MMOL/L (ref 3.5–5.1)
RBC # BLD AUTO: 3.82 MIL/UL (ref 4.2–5)
SODIUM SERPL-SCNC: 135 MMOL/L (ref 136–145)
WBC # BLD AUTO: 10.3 THOU/UL (ref 4–11)

## 2020-07-11 NOTE — O
[facility]
[address]
[place]   [address]                     OPERATIVE REPORT              
_______________________________________________________________________________
 
Name:       MICHEAL SAUNDERS             Room #:         436-       ADM IN  
M.R.#:      [id number]                       Account #:      [id number]  
Admission:  07/08/20    Attend Phys:    Kimberley Rodriguez MD     
Discharge:              Date of Birth:  [birth year]  
                                                          Report #: [id number]
                                                                    [id number]   
_______________________________________________________________________________
THIS REPORT FOR:  
 
cc:  Kimberley Rodriguez MD,Onesimo العلي MD, MD                                           ~
CC: Kimberley Rodriguez
 
PREOPERATIVE DIAGNOSIS:  Right femoral neck fracture with displacement.
 
POSTOPERATIVE DIAGNOSIS:  Right femoral neck fracture with displacement.
 
PROCEDURE:  Right proximal femoral hemiarthroplasty.
 
SURGEON:  Onesimo Rivera MD
 
INDICATIONS:  This 85-year-old female is still ambulatory, but has poor balance
and seems to fall frequently.  She also is confused and demented, making her
care difficult.  She fell again injuring the right hip.  X-rays revealed an
unstable femoral neck fracture.  I have discussed with her family the treatment
options and they prefer to go ahead with surgical hemiarthroplasty hoping that
she can resume ambulation despite her dementia and her balance issues.
 
DESCRIPTION OF PROCEDURE:  The patient was taken to the operating room where she
was placed under general anesthesia.  Prophylactic intravenous antibiotics were
administered.  She was turned to the left lateral decubitus position.  The right
hip, thigh and leg were meticulously prepped and draped.  A slightly curving
skin incision was made over the greater trochanter extending through fascia and
exposing the posterior aspect of the hip joint.  The short external rotators and
capsule were taken down and preserved and tagged with several #1 Tevdek sutures.
 The femoral neck was found to be fractured and unstable.  A femoral neck
osteotomy was performed and the head and neck were removed.  The head was
measured at 43 mm in diameter.  The canal was prepared using reamers and hand
broaches.  The Smith and Nephew hip system was utilized.  A size 12 cement stem
seemed to fit most appropriately.  A trial reduction was performed and the hip
was nicely reduced and stable when using a +0 neck length and a 43 mm unipolar
head.
 
These trial components were removed.  The canal was thoroughly irrigated and
dried.  A cement restrictor was placed.  Methyl methacrylate cement was mixed
and injected into the canal.  The Smith and Nephew size 12 Synergy cemented stem
was then inserted, placing this in about 15-20 degrees of anteversion.  It
seated nicely and appeared to be secure.  A 43 mm unipolar head and a +0 neck
length sleeve were then inserted.  This was impacted on the Cox taper and
seated nicely and appeared to be secure.  The hip was then reduced.  Alignment,
range of motion, stability and leg length were assessed and felt to be
satisfactory.  The capsule and short external rotators were then repaired back
 
 
 
[address]   [address]                     OPERATIVE REPORT              
_______________________________________________________________________________
 
Name:       TERRY SAUNDERSORE             Room #:         436-P       [facility] [place]  
M.R.#:      [id number]                       Account #:      [id number]  
Admission:  07/08/20    Attend Phys:    Kimberley Rodriguez MD     
Discharge:              Date of Birth:  [birth year]  
                                                          Report #: [id number]
                                                                    [id number]   
_______________________________________________________________________________
to bone using #1 Tevdek sutures, passed through small drill holes in greater
trochanter.  A single Hemovac was left in the wound exiting through a separate
stab incision.  The fascia was closed with multiple #1 Vicryl sutures.  The
subcutaneous tissues were closed with 0 Monocryl.  The skin was closed with skin
staples.  A sterile dressing was applied.  The patient was awakened and returned
to recovery room in good condition.
 
 
 
 
 
 
 
 
 
 
 
 
 
 
 
 
 
 
 
 
 
 
 
 
 
 
 
 
 
 
 
 
 
 
 
 
 
 
  <ELECTRONICALLY SIGNED>
   By: Onesimo Rivera MD           
  07/11/20     0923
D: 07/09/20 1835                           _____________________________________
T: 07/09/20 1843                           Onesimo Rivera MD             /nt

## 2020-07-11 NOTE — NUR
PT C/O PAIN ON HER R HIP,MANAGED WITH MED.DRSG TO HER R HIP C/D/I.PT
REPOSITIONED WHILE IN BED.DEGROOT TO DD WITH LIGHT YELLOW URINE NOTED IN THE
BAG.IVF INFUSING AS ORDERED.CALL LIGHT WITHIN REACH.

## 2020-07-11 NOTE — H
[facility]
[address]
[place]   [address]                     HISTORY AND PHYSICAL          
_______________________________________________________________________________
 
Name:       MICHEAL SAUNDERS             Room #:         436-       ADM IN  
M.R.#:      [id number]                       Account #:      [id number]  
Admission:  07/08/20    Attend Phys:    Kimberley Rodriguez MD     
Discharge:              Date of Birth:  [birth year]  
                                                          Report #: [id number]
                                                                    [id number]   
_______________________________________________________________________________
THIS REPORT FOR:  
 
cc:  Kimberley Rodriguez MD,Kimberley Rodriguez,Kimberley BARRIOS                                             ~
CC: Kimberley Rodriguez
 
DATE OF SERVICE:  07/08/2020
 
 
HISTORY OF PRESENT ILLNESS:  The patient is an 85-year-old female who was
brought to the Emergency Room after a fall at [facility] with a complaint
of pain on the right hip.  The patient does not know how she fell or why she
fell, but she did not have any loss of consciousness.  She did not have any head
injury.  She did not have any palpitation or dizziness or any nausea or
vomiting.
 
PAST MEDICAL HISTORY:  Significant for dementia of Lewy body, depression,
anxiety, type 2 diabetes mellitus, hypertension and hyperlipidemia.
 
MEDICATIONS:  Include metformin 500 mg 1 tablet b.i.d., loperamide 2 mg 4 times
a day as needed, magnesium hydroxide 5-15 mL p.o. t.i.d., bisacodyl 5 mg daily,
acetaminophen 2 tablets 4 times a day as needed, Zofran 4 mg every 8 hours as
needed, Ativan one tablet q.i.d. p.r.n., aspirin 81 mg daily and Seroquel 25 mg
daily.
 
ALLERGIES:  No known drug allergies.
 
REVIEW OF SYSTEMS:  The patient is pleasant.  She is not in any distress.  The
patient denies any pain.  She denies any headache, blurred vision, runny nose,
sore throat, cough, chest pain, shortness of breath or palpitation.  She denies
any nausea, vomiting or changes in her bowels.  She denies any pain in the arms
or legs.
 
PHYSICAL EXAMINATION:
VITAL SIGNS:  The patient's temperature on arrival to the hospital was 98.0,
pulse 66, respirations 12, blood pressure 194/90.  Last blood pressure is
152/81.
HEAD AND NECK:  Unremarkable.  Neck was supple.
LUNGS:  Clear to auscultation with good air entry bilaterally.
CARDIAC:  S1, S2, without any murmur or gallop.
ABDOMEN:  Benign.  Bowel sounds were positive.
EXTREMITIES:  Without any edema.
 
LABORATORY DATA:  The patient's CBC with diff showed white count of 9.5,
hemoglobin 13.9, hematocrit 41.2, platelet count 275, and neutrophils are 65%. 
 
 
 
[place] [address]                     HISTORY AND PHYSICAL          
_______________________________________________________________________________
 
Name:       MICHEAL SAUNDERS             Room #:         [address] [place] [address] [place]  
..#:      [id number]                       Account #:      [id number]  
Admission:  07/08/20    Attend Phys:    Kimberley Rodriguez MD     
Discharge:              Date of Birth:  [birth year]  
                                                          Report #: [id number]
                                                                    [id number]   
_______________________________________________________________________________
The patient's basic metabolic panel showed a sodium of 138, potassium 3.9,
chloride 102, bicarbonate 28, BUN 10, creatinine 0.9, and glucose is 172.  The
patient's x-ray of the chest showed no acute cardiopulmonary process.  X-ray of
the left elbow showed no fracture or deformity detected.  X-ray of the right hip
showed impacted right subcapital fracture with mild rotation of the fracture
site.  Troponin less than 0.06.  INR is 1.  A 12-lead EKG showed left
ventricular hypertrophy, baseline wandering in lead V5, otherwise no acute
changes.  Urinalysis showed +1 leukocyte, white blood cells are 6-15, red blood
cells not seen, bacteria more than 30.  Repeated EKG showed sinus tachycardia,
left anterior fascicular block, probable left ventricular hypertrophy, anterior
Q-wave, possibly due to left ventricular hypertrophy.  Basic metabolic panel
showed a sodium of 137, potassium 3.8, chloride 104, bicarbonate 24, BUN 9,
creatinine 0.7, glucose 192.  Repeated CBC from today was normal.
 
ASSESSMENT AND PLAN:  Status post fall with right hip fracture.  The patient
does not have any cardiac issues and her physical examination is normal.  The
patient will be accepted to go to surgery.  The patient will require
post-surgical repair, deep vein thrombosis prophylaxis.  I will send the urine
for culture and treat urinary tract infection if it comes back positive.  I will
resume the patient's medications and start the patient on sliding scale.
 
 
 
 
 
 
 
 
 
 
 
 
 
 
 
 
 
 
 
 
 
 
 
 
  <ELECTRONICALLY SIGNED>
   By: Kimberley Rodriguez MD             
  07/11/20     0928
D: 07/09/20 0710                           _____________________________________
T: 07/09/20 0740                           Kimberley Rodriguez MD               /nt

## 2020-07-12 VITALS — DIASTOLIC BLOOD PRESSURE: 58 MMHG | SYSTOLIC BLOOD PRESSURE: 133 MMHG

## 2020-07-12 VITALS — DIASTOLIC BLOOD PRESSURE: 64 MMHG | SYSTOLIC BLOOD PRESSURE: 125 MMHG

## 2020-07-12 VITALS — DIASTOLIC BLOOD PRESSURE: 53 MMHG | SYSTOLIC BLOOD PRESSURE: 134 MMHG

## 2020-07-12 LAB
ANION GAP SERPL CALC-SCNC: 8 MMOL/L (ref 7–16)
BASOPHILS NFR BLD AUTO: 0.8 % (ref 0–2)
BUN SERPL-MCNC: 9 MG/DL (ref 7–18)
CALCIUM SERPL-MCNC: 7.9 MG/DL (ref 8.5–10.1)
CHLORIDE SERPL-SCNC: 103 MMOL/L (ref 98–107)
CO2 SERPL-SCNC: 26 MMOL/L (ref 21–32)
CREAT SERPL-MCNC: 0.6 MG/DL (ref 0.6–1)
EOSINOPHIL NFR BLD: 1.4 % (ref 0–3)
ERYTHROCYTE [DISTWIDTH] IN BLOOD BY AUTOMATED COUNT: 13.7 % (ref 10.5–14.5)
GLUCOSE SERPL-MCNC: 185 MG/DL (ref 74–106)
GRANULOCYTES NFR BLD MANUAL: 67.6 % (ref 36–66)
HCT VFR BLD CALC: 33.4 % (ref 37–47)
HGB BLD-MCNC: 11.4 GM/DL (ref 12–15)
LYMPHOCYTES NFR BLD AUTO: 19.9 % (ref 24–44)
MCH RBC QN AUTO: 31.1 PG (ref 26–34)
MCHC RBC AUTO-ENTMCNC: 34.3 G/DL (ref 28–37)
MCV RBC: 90.9 FL (ref 80–100)
MONOCYTES NFR BLD: 10.3 % (ref 1–8)
NEUTROPHILS # BLD: 6.5 THOU/UL (ref 1.4–8.2)
PLATELET # BLD: 198 THOU/UL (ref 150–400)
POTASSIUM SERPL-SCNC: 3.2 MMOL/L (ref 3.5–5.1)
RBC # BLD AUTO: 3.67 MIL/UL (ref 4.2–5)
SODIUM SERPL-SCNC: 137 MMOL/L (ref 136–145)
WBC # BLD AUTO: 9.7 THOU/UL (ref 4–11)

## 2020-07-12 NOTE — NUR
PATIENT IS UP IN BEDSIDE CHAIR. ATE SMALL AMOUNT OF LUNCH 25%. SPOKE WITH SON
ON PHONE. GIVEN PRN PAIN MED RESTING COMFORTABLY NO PAIN OR RESP DISTRESS.

## 2020-07-12 NOTE — NUR
PT WAS IN BED AT START OF SHIFT.PT WAS OBSERVED TRYING TO CROSS HER
LEGS,PILLOW PUT IN BETWEEN HER LEG AS A REMINDER.PT REF TO TAKE ALL HER PO
MEDS AT HS. DRSG WITH A MIN DRAINAGE.CINDY HOSE AND SCD IN PLACE.PT SLEEPING ON
HER BED AT THIS TIME.FALL PRECAUTIONS IN PLACE,CALL LIGHT WITHIN REACH.

## 2020-07-12 NOTE — NUR
ASSUMED CARE OF PATIENT SHE STATES GENERAL PAIN GAVE PRN IV PAIN MED. WILL LET
PATIENT SLEEP UNTIL BREAKFAST.

## 2020-07-13 VITALS — DIASTOLIC BLOOD PRESSURE: 73 MMHG | SYSTOLIC BLOOD PRESSURE: 144 MMHG

## 2020-07-13 NOTE — NUR
PHYSICIAN HAD INDICATED PT WAS MEDICALLY STABLE TO DC TO [facility] SKILLED
SUNDAY BUT WE WERE AWAITING AUTH. AUTH WAS RECIEVED THIS AFTERNOON. [facility] TRANSPORT ARRAGNED FOR 1430 VIA SECURE. CHART COPY MADE. ORDERS FAXED.
PT'S SON DAVID NOTIFIED. HE IS AGREEABLE. REPORT TO BE CALLED TO [phone].
NO OTHER CM INTERVENTION INDICATED. CASE CLOSED.

## 2020-07-13 NOTE — NUR
GIOVANA CONTACTED EMANI IN ADMISSIONS AT [facility] SKILLED THIS AM TO INQUIRE ABOUT
AUTH. SHE INDICATED THEY ARE STILL WAITING ON IT. CLINICAL UPDATES SENT OVER.
CM FOLLOWING.

## 2020-07-13 NOTE — NUR
PT DISCHARGING TODAY TO [facility] FAXED DC ORDERS/SUMMARY TO FACILITY
SPOKE WITH EMANI IN ADM SHE RECEIVED ORDERS AND ARRANGED TRANSPORT BY [facility] FOR 1430 TODAY. NOTIFIED PT'S SON (DAVID) OF DC AND TIME OF TRANSPORT. UNIT
NOTIFIED AND CHART COPY PER US.